# Patient Record
Sex: MALE | Race: WHITE
[De-identification: names, ages, dates, MRNs, and addresses within clinical notes are randomized per-mention and may not be internally consistent; named-entity substitution may affect disease eponyms.]

---

## 2020-11-20 ENCOUNTER — HOSPITAL ENCOUNTER (OUTPATIENT)
Dept: HOSPITAL 19 - COL.RAD | Age: 80
End: 2020-11-20
Attending: UROLOGY
Payer: MEDICARE

## 2020-11-20 DIAGNOSIS — N28.89: ICD-10-CM

## 2020-11-20 DIAGNOSIS — C61: Primary | ICD-10-CM

## 2020-11-20 PROCEDURE — A9503 TC99M MEDRONATE: HCPCS

## 2021-05-12 ENCOUNTER — HOSPITAL ENCOUNTER (INPATIENT)
Dept: HOSPITAL 19 - INPTSU | Age: 81
LOS: 36 days | Discharge: HOME | DRG: 658 | End: 2021-06-17
Attending: UROLOGY | Admitting: UROLOGY
Payer: MEDICARE

## 2021-05-12 VITALS — HEIGHT: 69 IN | BODY MASS INDEX: 33.99 KG/M2 | WEIGHT: 229.5 LBS

## 2021-05-12 DIAGNOSIS — C64.2: Primary | ICD-10-CM

## 2021-05-12 DIAGNOSIS — I12.9: ICD-10-CM

## 2021-05-12 DIAGNOSIS — E78.5: ICD-10-CM

## 2021-05-12 DIAGNOSIS — E11.22: ICD-10-CM

## 2021-05-12 DIAGNOSIS — E78.00: ICD-10-CM

## 2021-05-12 DIAGNOSIS — Z85.46: ICD-10-CM

## 2021-05-12 DIAGNOSIS — E66.01: ICD-10-CM

## 2021-05-12 DIAGNOSIS — N18.30: ICD-10-CM

## 2021-05-12 DIAGNOSIS — Z88.2: ICD-10-CM

## 2021-05-12 PROCEDURE — A4314 CATH W/DRAINAGE 2-WAY LATEX: HCPCS

## 2021-06-14 LAB
ALBUMIN SERPL-MCNC: 3.5 GM/DL (ref 3.5–5)
ALP SERPL-CCNC: 75 U/L (ref 50–136)
ALT SERPL-CCNC: 19 U/L (ref 4–49)
ANION GAP SERPL CALC-SCNC: 4 MMOL/L (ref 7–16)
AST SERPL-CCNC: 28 U/L (ref 15–37)
BILIRUB SERPL-MCNC: 0.7 MG/DL (ref 0–1)
BUN SERPL-MCNC: 20 MG/DL (ref 9–20)
CALCIUM SERPL-MCNC: 9.6 MG/DL (ref 8.4–10.2)
CHLORIDE SERPL-SCNC: 106 MMOL/L (ref 98–107)
CO2 SERPL-SCNC: 29 MMOL/L (ref 22–30)
CREAT SERPL-SCNC: 1.12 UMOL/L (ref 0.66–1.25)
ERYTHROCYTE [DISTWIDTH] IN BLOOD BY AUTOMATED COUNT: 12.9 % (ref 11.5–14.5)
GLUCOSE SERPL-MCNC: 118 MG/DL (ref 74–106)
HCT VFR BLD AUTO: 48.2 % (ref 42–52)
HGB BLD-MCNC: 15.9 G/DL (ref 13.5–18)
MCH RBC QN AUTO: 31 PG (ref 27–31)
MCHC RBC AUTO-ENTMCNC: 33 G/DL (ref 33–37)
MCV RBC AUTO: 95 FL (ref 80–100)
PLATELET # BLD AUTO: 199 K/MM3 (ref 130–400)
PMV BLD AUTO: 10.5 FL (ref 7.4–10.4)
POTASSIUM SERPL-SCNC: 4.2 MMOL/L (ref 3.4–5)
PROT SERPL-MCNC: 7 GM/DL (ref 6.4–8.2)
RBC # BLD AUTO: 5.08 M/MM3 (ref 4.2–5.6)
SODIUM SERPL-SCNC: 138 MMOL/L (ref 137–145)

## 2021-06-15 VITALS — SYSTOLIC BLOOD PRESSURE: 131 MMHG | DIASTOLIC BLOOD PRESSURE: 67 MMHG | TEMPERATURE: 98.8 F | HEART RATE: 81 BPM

## 2021-06-15 VITALS — DIASTOLIC BLOOD PRESSURE: 59 MMHG | SYSTOLIC BLOOD PRESSURE: 106 MMHG | TEMPERATURE: 98.5 F | HEART RATE: 67 BPM

## 2021-06-15 VITALS — SYSTOLIC BLOOD PRESSURE: 112 MMHG | DIASTOLIC BLOOD PRESSURE: 62 MMHG | HEART RATE: 73 BPM

## 2021-06-15 VITALS — SYSTOLIC BLOOD PRESSURE: 130 MMHG | DIASTOLIC BLOOD PRESSURE: 72 MMHG | HEART RATE: 79 BPM

## 2021-06-15 VITALS — SYSTOLIC BLOOD PRESSURE: 102 MMHG | DIASTOLIC BLOOD PRESSURE: 71 MMHG | HEART RATE: 74 BPM

## 2021-06-15 VITALS — SYSTOLIC BLOOD PRESSURE: 102 MMHG | TEMPERATURE: 98.4 F | DIASTOLIC BLOOD PRESSURE: 64 MMHG | HEART RATE: 93 BPM

## 2021-06-15 VITALS — HEART RATE: 73 BPM | DIASTOLIC BLOOD PRESSURE: 69 MMHG | SYSTOLIC BLOOD PRESSURE: 105 MMHG

## 2021-06-15 VITALS — HEART RATE: 67 BPM | DIASTOLIC BLOOD PRESSURE: 59 MMHG | SYSTOLIC BLOOD PRESSURE: 106 MMHG | TEMPERATURE: 98.5 F

## 2021-06-15 VITALS — DIASTOLIC BLOOD PRESSURE: 58 MMHG | SYSTOLIC BLOOD PRESSURE: 110 MMHG | HEART RATE: 75 BPM

## 2021-06-15 VITALS — SYSTOLIC BLOOD PRESSURE: 132 MMHG | DIASTOLIC BLOOD PRESSURE: 82 MMHG | TEMPERATURE: 98.1 F | HEART RATE: 75 BPM

## 2021-06-15 VITALS — DIASTOLIC BLOOD PRESSURE: 85 MMHG | HEART RATE: 76 BPM | SYSTOLIC BLOOD PRESSURE: 101 MMHG

## 2021-06-15 PROCEDURE — 0TB14ZZ EXCISION OF LEFT KIDNEY, PERCUTANEOUS ENDOSCOPIC APPROACH: ICD-10-PCS | Performed by: UROLOGY

## 2021-06-15 PROCEDURE — 8E0W4CZ ROBOTIC ASSISTED PROCEDURE OF TRUNK REGION, PERCUTANEOUS ENDOSCOPIC APPROACH: ICD-10-PCS | Performed by: UROLOGY

## 2021-06-15 NOTE — NUR
PATIENT ADMITED INTO ROOM 325 POST OP. A&O. VSS. DENIES PAIN OR NAUSEA AT THIS
TIME. SON AT BEDSIDE. ABD LAP SITES X5 ARE CD&I AND RIKKI. BURT TO COMPRESSION
WITH SMALL AMOUNT OF BLOODY DRAINAGE NOTED. GAN TO DD WITH SMALL AMOUNTS OF
CLEAR YELLOW URINE NOTED. IV FLUIDS INFUSING VIA PUMP INTO RIGHT HAND IV. HEAD
TO TOE ASSESSMENT COMPLETE. ORIENTED TO ROOM. CALL LIGHT IN REACH.

## 2021-06-16 VITALS — HEART RATE: 65 BPM | DIASTOLIC BLOOD PRESSURE: 56 MMHG | TEMPERATURE: 97.8 F | SYSTOLIC BLOOD PRESSURE: 98 MMHG

## 2021-06-16 VITALS — SYSTOLIC BLOOD PRESSURE: 104 MMHG | DIASTOLIC BLOOD PRESSURE: 50 MMHG | TEMPERATURE: 98.2 F | HEART RATE: 73 BPM

## 2021-06-16 VITALS — DIASTOLIC BLOOD PRESSURE: 59 MMHG | HEART RATE: 67 BPM | SYSTOLIC BLOOD PRESSURE: 106 MMHG | TEMPERATURE: 98.5 F

## 2021-06-16 VITALS — HEART RATE: 70 BPM | TEMPERATURE: 98.4 F | SYSTOLIC BLOOD PRESSURE: 107 MMHG | DIASTOLIC BLOOD PRESSURE: 57 MMHG

## 2021-06-16 VITALS — DIASTOLIC BLOOD PRESSURE: 56 MMHG | TEMPERATURE: 98.5 F | SYSTOLIC BLOOD PRESSURE: 109 MMHG | HEART RATE: 59 BPM

## 2021-06-16 VITALS — TEMPERATURE: 98.5 F | SYSTOLIC BLOOD PRESSURE: 109 MMHG | DIASTOLIC BLOOD PRESSURE: 56 MMHG | HEART RATE: 59 BPM

## 2021-06-16 VITALS — HEART RATE: 60 BPM | DIASTOLIC BLOOD PRESSURE: 58 MMHG | SYSTOLIC BLOOD PRESSURE: 109 MMHG | TEMPERATURE: 98.1 F

## 2021-06-16 VITALS — HEART RATE: 72 BPM | DIASTOLIC BLOOD PRESSURE: 58 MMHG | SYSTOLIC BLOOD PRESSURE: 110 MMHG | TEMPERATURE: 97.6 F

## 2021-06-16 LAB
ANION GAP SERPL CALC-SCNC: 1 MMOL/L (ref 7–16)
BUN SERPL-MCNC: 19 MG/DL (ref 9–20)
CALCIUM SERPL-MCNC: 8.4 MG/DL (ref 8.4–10.2)
CHLORIDE SERPL-SCNC: 105 MMOL/L (ref 98–107)
CO2 SERPL-SCNC: 29 MMOL/L (ref 22–30)
CREAT SERPL-SCNC: 1.23 UMOL/L (ref 0.66–1.25)
GLUCOSE SERPL-MCNC: 92 MG/DL (ref 74–106)
HCT VFR BLD AUTO: 37.6 % (ref 42–52)
HGB BLD-MCNC: 12.6 G/DL (ref 13.5–18)
POTASSIUM SERPL-SCNC: 4 MMOL/L (ref 3.4–5)
SODIUM SERPL-SCNC: 135 MMOL/L (ref 137–145)

## 2021-06-16 NOTE — NUR
PATIENT ALERT AND ORIENTED X 4.  POD#1 S/P ROBOTIC PARTIAL NEPHRECTOMY.  ABD
LAPS SITE CLEAN DRY AND INTACT.  GAN TO GRAVITY INTACT DRAINING CLEAR YELLOW
URINE.  LEFT SIDE BURT DRAIN INTACT TO SUCTION.  VITAL SIGNS STABLE.  PAIN
MANAGE WITH TYLENOL AROUND THE CLOCK.  WILL CONTINUE TO MONITOR.

## 2021-06-16 NOTE — NUR
met with the patient and the patient's son, Basilio to complete
intake. The patient lives independently in [place]. The patient denies DME
use. The patient's PCP is Dr. Babb and [facility] The patient does not have advanced directives in the EMR but states
they are complete and designate Basilio. The patient plans to return home at
discharge. The patient's son inquired about [facility] [employer] [facility] and other home
services. [facility] provided the contact information for [facility]. The patient
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 [facility]
discussed home health and provided [email or web address]'s list of home health
agencies. They will look them over and decide if they would like home health
or not. The patient reports he has some friends that are supportive as well.
[facility] collaborated with the patient's nurse to order PT for the patient.
 
*Discharge disposition: Home (possibly with home health)*

## 2021-06-16 NOTE — NUR
DC'D BURT DRAIN PER ORDERS. NOTED 30CC OF BLOODY DRAINAGE IN BURT BULB. PATIENT
TOLERATED WELL. COVERED SITE WITH GAUZE & FOAM TAPE. PATIENT SITTING UP IN
BEDSIDE CHAIR LOOKING AT MENU. NO OTHER NEEDS. PATIENT THANKED NURSING FOR OUR
GOOD CARE. CALL LIGHT IN REACH.

## 2021-06-16 NOTE — NUR
Patient is sitting in chair reading a book. He is alert and oriented, VS
stable, reports no pain at the moment, call light within reach.

## 2021-06-16 NOTE — NUR
Patient is alert and oriented x 4, vitals signs stable, patient reports pain
2-3 in a 10 numeric scale, requred medication before PT, gave PRN ultram 2
tablets with his morning meds. Patient is a partial left nephrectomy,
abdominal lap sites x 5 and are clean, dry and intact and open to air. Bowel
sounds present all quadrants, patient passing gas, no complains of nausea and
vomiting. Head to toe assessment complete. Patient tolerating clear diet, diet
advanced per orders. No other needs at this time. Call at reach.

## 2021-06-17 VITALS — TEMPERATURE: 97.8 F | DIASTOLIC BLOOD PRESSURE: 87 MMHG | SYSTOLIC BLOOD PRESSURE: 114 MMHG | HEART RATE: 73 BPM

## 2021-06-17 VITALS — SYSTOLIC BLOOD PRESSURE: 107 MMHG | TEMPERATURE: 98.6 F | DIASTOLIC BLOOD PRESSURE: 55 MMHG | HEART RATE: 72 BPM

## 2021-06-17 NOTE — NUR
Initial visit; Patient thanked  for looking in on him and also
offering a [employer] known for its in-depth Bible Studies when patient inquired
this of .

## 2021-06-17 NOTE — NUR
PT stated pt doing well and does not feel that patient needs home health.  Pt
also states that he does not want home health.  He has been up walking in the
halls independently, reports pain is tolerable and does not need additional
pain medication.  Discussed with Dr Hamilton and Dr Hamilton wanted us to speak to
his son.  Basilio, pt's son just arrived to the floor.  Basilio's concerns are
that the patient does not have his apartment adequately stocked with food or
supplies to cook with.  States that his mattress is too high for him to get in
and out of bed.  Also reports that his toilet is low to the ground and he is
concerned that he will have difficulty getting up off the toilet.  Informed
him that he will maybe need to get a stool riser and mentioned meals on wheels
for him.  I stated that home health would not help with stock his apartment
with supplies or food.  Basilio stated that he does have stairs as his apartment
is in the basement and he has about 6 steps going down.  Called Alycia with
PT and she stated she would come by and assess his doing stairs.

## 2021-06-17 NOTE — NUR
The patient discharged home today, 6/17. The patient is independent and did
not need services. The patient's son Basilio was concerned about the patient
being able to navigate his stairs at home. PT met with the patient and after
the evaluation reports the patient was able to demonstrate safety and
independence this day and walking 600 ft.  The patient declined home health.
 
*Discharge disposition: Home

## 2021-06-17 NOTE — NUR
PT worked with pt on the stairs and pt did well.  Son is satisfied with pts
activity and feels he is okay to go home.  Dr Hamilton was notified and orders
received. Informed son that would be best if he could help his father get some
groceries and kitchen supplies if that was his concern for him going home.  Pt
stated that he did get a stool riser for him.  Reviewed discharge instructions
with pt and his son.  All questions answered.  Pt is going to eat lunch before
leaving.  Informed them to notify nursing so that we can walk him out.  INT
removed from right hand

## 2021-06-17 NOTE — NUR
PATIENT ALERT AND ORIENTED, POD #2  PROGRESS WELL. AMBULATE SEVERAL TIME IN
HALLWAY. PAIN MANAGE WITH TRAMADOL.  TOLERATES GENERAL DIET, VITAL SIGNS
STABLE.  PLAN TO D/C HOME TODAY WITH SERVICES.

## 2022-06-16 NOTE — NUR
1. Discontinue daily BP checks    RAY Lopez CNP on 6/16/2022 at 12:50 PM                   Pt doing really well this morning.  Dr Hamilton has been in to see patient,
discussed discharge.  Pt reports he is ready to go home.  Pain is tolerable
and denies the need for pain medication at this time.